# Patient Record
(demographics unavailable — no encounter records)

---

## 2024-11-14 NOTE — PHYSICAL EXAM
[Normal Conjunctiva] : the conjunctiva exhibited no abnormalities [Eyelids - No Xanthelasma] : the eyelids demonstrated no xanthelasmas [Normal Oral Mucosa] : normal oral mucosa [No Oral Pallor] : no oral pallor [No Oral Cyanosis] : no oral cyanosis [Normal Jugular Venous A Waves Present] : normal jugular venous A waves present [Normal Jugular Venous V Waves Present] : normal jugular venous V waves present [No Jugular Venous Peralta A Waves] : no jugular venous peralta A waves [Respiration, Rhythm And Depth] : normal respiratory rhythm and effort [Exaggerated Use Of Accessory Muscles For Inspiration] : no accessory muscle use [Auscultation Breath Sounds / Voice Sounds] : lungs were clear to auscultation bilaterally [Heart Rate And Rhythm] : heart rate and rhythm were normal [Heart Sounds] : normal S1 and S2 [Murmurs] : no murmurs present [Abdomen Soft] : soft [Abdomen Tenderness] : non-tender [Abdomen Mass (___ Cm)] : no abdominal mass palpated [Gait - Sufficient For Exercise Testing] : the gait was sufficient for exercise testing [Abnormal Walk] : normal gait [Skin Color & Pigmentation] : normal skin color and pigmentation [] : no rash [No Venous Stasis] : no venous stasis [Skin Lesions] : no skin lesions [No Xanthoma] : no  xanthoma was observed [Oriented To Time, Place, And Person] : oriented to person, place, and time [Affect] : the affect was normal [Mood] : the mood was normal [No Anxiety] : not feeling anxious [FreeTextEntry1] : see above

## 2024-11-14 NOTE — REASON FOR VISIT
[Follow-Up - Clinic] : a clinic follow-up of [FreeTextEntry2] :  vascular follow up [FreeTextEntry1] : 11/13/2024  Since last visit patient reports no C/P or SOB. Did not see Dr. Weiner. Reports varicose veins symptoms are controlled.  On ASA 81 mg daily.   11/9/2023 Doing ok  On eliquis 5mg BID no bleeding issues leg feels better veins are softer .     71M Varicose veins.   Had pain in the veins L medial calf Reports prior R leg phlebectomy 15 years ago  He is on aspirin  He does not wear compressoin  The L mid calf hurts him, and the vein feels like a hard lump  Meidcaitons: Aspirin  Metformin  Colonoscopy 6-7 years ago PSA-  7.9  He sees a urologist.  Seeing again soon.  No family history of blood clots He is on aspirin , no coronary stents, no CVA  Recent flight from [place], going again in 2 weeks.

## 2024-11-14 NOTE — PHYSICAL EXAM
[Normal Conjunctiva] : the conjunctiva exhibited no abnormalities [Eyelids - No Xanthelasma] : the eyelids demonstrated no xanthelasmas [Normal Oral Mucosa] : normal oral mucosa [No Oral Pallor] : no oral pallor [No Oral Cyanosis] : no oral cyanosis [Normal Jugular Venous A Waves Present] : normal jugular venous A waves present [Normal Jugular Venous V Waves Present] : normal jugular venous V waves present [No Jugular Venous Peralta A Waves] : no jugular venous peralta A waves [Respiration, Rhythm And Depth] : normal respiratory rhythm and effort [Exaggerated Use Of Accessory Muscles For Inspiration] : no accessory muscle use [Auscultation Breath Sounds / Voice Sounds] : lungs were clear to auscultation bilaterally [Heart Rate And Rhythm] : heart rate and rhythm were normal [Heart Sounds] : normal S1 and S2 [Murmurs] : no murmurs present [Abdomen Soft] : soft [Abdomen Tenderness] : non-tender [Abdomen Mass (___ Cm)] : no abdominal mass palpated [Abnormal Walk] : normal gait [Gait - Sufficient For Exercise Testing] : the gait was sufficient for exercise testing [Skin Color & Pigmentation] : normal skin color and pigmentation [] : no rash [No Venous Stasis] : no venous stasis [Skin Lesions] : no skin lesions [No Xanthoma] : no  xanthoma was observed [Oriented To Time, Place, And Person] : oriented to person, place, and time [Affect] : the affect was normal [Mood] : the mood was normal [No Anxiety] : not feeling anxious [FreeTextEntry1] : see above

## 2024-11-14 NOTE — ASSESSMENT
[FreeTextEntry1] : Assessment: 1. VV - L leg thrombosed VV - large amount, and symptomatic - resolved 2. Dm  Plan 1. History of thrombophlebitis: Continue ASA 81 mg daily. Eliquis 1 tab 1 hour prior to flight or drive 4 hours or more. Compression stockings. 2. HTN: Continue ACEI. Home BP monitoring. Low salt diet. Echocardiogram ordered (no recent cardiac testing).  3. Health Maintenance: Healthy diet and exercise. Follow-up with urologist for elevated PSA. 4: Varicose Veins: If bothersome in the future. Referral to Dr. Weiner to eval for phlebectomy. 5. Follow-up in 6 months. Call with any questions. Office will call with test results.      I, Dr. Radames Jacome, personally performed the evaluation and management (E/M) services for this established patient who presents today.  That E/M includes conducting the examination, assessing all new/exacerbated conditions, and establishing a new plan of care.  Today, my ACP, Nadja Shaver, was here to observe my evaluation and management services for this new problem/exacerbated condition to be followed going forward.

## 2024-11-15 NOTE — HISTORY OF PRESENT ILLNESS
[No Pertinent Cardiac History] : no history of aortic stenosis, atrial fibrillation, coronary artery disease, recent myocardial infarction, or implantable device/pacemaker [No Pertinent Pulmonary History] : no history of asthma, COPD, sleep apnea, or smoking [No Adverse Anesthesia Reaction] : no adverse anesthesia reaction in self or family member [Chronic Kidney Disease] : chronic kidney disease [Diabetes] : diabetes [(Patient denies any chest pain, claudication, dyspnea on exertion, orthopnea, palpitations or syncope)] : Patient denies any chest pain, claudication, dyspnea on exertion, orthopnea, palpitations or syncope [Excellent (>10 METs)] : Excellent (>10 METs) [Chronic Anticoagulation] : no chronic anticoagulation [FreeTextEntry1] : Microscopic anterior cervical discectomy and fusion allograft instrumentation  [FreeTextEntry2] : 12/02/2024 [FreeTextEntry3] : Dago Fernandez [FreeTextEntry4] : preop for cervical discectomy for neck pain [FreeTextEntry7] : EKG- NSR no ischemia

## 2025-05-28 NOTE — HISTORY OF PRESENT ILLNESS
[No Pertinent Cardiac History] : no history of aortic stenosis, atrial fibrillation, coronary artery disease, recent myocardial infarction, or implantable device/pacemaker [FreeTextEntry1] : Prostate cystoscopy [FreeTextEntry2] : 06/20/2025 [FreeTextEntry3] : Dr Gilmore [FreeTextEntry4] : preop for prostate biopsy  DM- is back on trulicty will recheck levels  htn- well controlled  EKG- NSR, rate 87 bpm, no ischemia

## 2025-05-28 NOTE — ASSESSMENT
[FreeTextEntry4] : preop for prostate biopsy  DM- is back on trulicty will recheck levels  htn- well controlled  EKG- NSR, rate 87 bpm, no ischemia  CHRISTOPHER MICHAELS is medically optimized for proposed surgical procedure and anesthesia.

## 2025-07-23 NOTE — PHYSICAL EXAM
[Alert] : alert [Well Nourished] : well nourished [No Acute Distress] : no acute distress [Well Developed] : well developed [Normal Sclera/Conjunctiva] : normal sclera/conjunctiva [EOMI] : extra ocular movement intact [No Proptosis] : no proptosis [Thyroid Not Enlarged] : the thyroid was not enlarged [No Respiratory Distress] : no respiratory distress [No Accessory Muscle Use] : no accessory muscle use [Normal Rate] : heart rate was normal [Regular Rhythm] : with a regular rhythm [No Edema] : no peripheral edema [Not Tender] : non-tender [Not Distended] : not distended [Soft] : abdomen soft [No Stigmata of Cushings Syndrome] : no stigmata of Cushings Syndrome [Normal Gait] : normal gait [Right foot was examined, including] : right foot ~C was examined, including visual inspection with sensory and pulse exams [Left foot was examined, including] : left foot ~C was examined, including visual inspection with sensory and pulse exams [Oriented x3] : oriented to person, place, and time [Normal Affect] : the affect was normal [Acanthosis Nigricans] : no acanthosis nigricans [de-identified] : s/p b/l TKR

## 2025-07-23 NOTE — PHYSICAL EXAM
[Alert] : alert [Well Nourished] : well nourished [No Acute Distress] : no acute distress [Well Developed] : well developed [Normal Sclera/Conjunctiva] : normal sclera/conjunctiva [EOMI] : extra ocular movement intact [No Proptosis] : no proptosis [Thyroid Not Enlarged] : the thyroid was not enlarged [No Respiratory Distress] : no respiratory distress [No Accessory Muscle Use] : no accessory muscle use [Normal Rate] : heart rate was normal [Regular Rhythm] : with a regular rhythm [No Edema] : no peripheral edema [Not Tender] : non-tender [Not Distended] : not distended [Soft] : abdomen soft [No Stigmata of Cushings Syndrome] : no stigmata of Cushings Syndrome [Normal Gait] : normal gait [Right foot was examined, including] : right foot ~C was examined, including visual inspection with sensory and pulse exams [Left foot was examined, including] : left foot ~C was examined, including visual inspection with sensory and pulse exams [Oriented x3] : oriented to person, place, and time [Normal Affect] : the affect was normal [Acanthosis Nigricans] : no acanthosis nigricans [de-identified] : s/p b/l TKR

## 2025-07-23 NOTE — ASSESSMENT
[Diabetes Foot Care] : diabetes foot care [Long Term Vascular Complications] : long term vascular complications of diabetes [Carbohydrate Consistent Diet] : carbohydrate consistent diet [Importance of Diet and Exercise] : importance of diet and exercise to improve glycemic control, achieve weight loss and improve cardiovascular health [Self Monitoring of Blood Glucose] : self monitoring of blood glucose [Sick-Day Management] : sick-day management [Retinopathy Screening] : Patient was referred to ophthalmology for retinopathy screening [Diabetic Medications] : Risks and benefits of diabetic medications were discussed [FreeTextEntry1] : 74yo M with prostate ca, dm2, htn, hld.   #DM Type 2, previously uncontrolled >> now improving control  A1c 9.3 >> 7.6%  goal < 7%  - increase to Trulicity 3mg sc weekly after he is back from upcoming cruise  - c/w metformin 1gm bid for now  - Work on diet >> cut out excess carbs/sweets/cakes, etc. especially while cruising (nearly every month pt/spouse take a cruise/vacation).  - SMBG 1-2x/day, fasting and other times of the day in a staggered manner at least for the 2 weeks prior to f/u visits; keep a log to bring along to next visit - Reviewed with patient BG targets for fasting (80-130mg/dL), premeal (<140mg/dL), 2 hours postprandial (< 180mg/dL) as well as hypoglycemia (<70mg/dL) signs/symptoms/risks & management. - Call office with highs/lows in BG. - I discussed the importance of avoiding mild hypoglycemia (FS<70 mg/dl) and severe hypoglycemia (FS<55 mg/dl) with the patient. I also discussed hypoglycemia correction with 4 oz of juice or 4 glucose tablets and rechecking FS in 15 minutes. If FS is still low, repeat 4oz juice or 4 glucose tablets and consume 12 grams of carbohydrates.  - Discussed diabetic diet, decreased intake of simple/processed sugars, increase intake of whole foods with protein and fiber. Increase physical activity as tolerated with cardiovascular exercise 150 min/per week consistently and resistance exercise three days per week.  - Recommended yearly foot exams and home foot precautions. Podiatry referral given today. Has toe nail fungus/discoloration, neuropathy  - Recommended at least yearly ophthalmology exams or as recommended by ophtho - reports UTD, no DR  - Counseling: We discussed diabetes foot care, long term complications of diabetes including but not limited to neuropathy, nephropathy, retinopathy and cardiovascular disease and the benefits of good glycemic control in preventing said complications. We discussed the risks and benefits of diabetes medications as relevant for today, prevention and management of hypoglycemia, importance of medication compliance and blood glucose monitoring.   #HLD LDL goal < 70 - rx for atorvastatin 40mg as well as zetia/fenofibrate - reports he is no longer taking zetia/fenofibrate and also not taking statin, has not been consistent with it, often forgets it at bedtime but planning to resume  - LDL above goal in 140s - restart Atorvastatin 40mg qhs, repeat fasting lipid panel once consistently taking it    #HTN BP goal < 130/80 UACR wnl in May 2025  - c/w lisinopril 20mg daily per pcp   RTC in 3-4 months   Patient verbalized understanding of recommendations and agrees to plan as detailed above.

## 2025-07-23 NOTE — HISTORY OF PRESENT ILLNESS
[FreeTextEntry1] : 72yo M with prostate ca, dm2, htn, hld presenting for initial evaluation.  Here with his wife today.   Preferred name "Dago"   DM2 diagnosed: > 20 years ago  mother also w/ dm2   prior endocrinologist: n/a   current regimen: Trulicity 1.5mg weekly, metformin 1gm BID   past meds & reason for discontinuation: n/a   HbA1c trend: 9.3% in May 2025 >> 7.6% July 2025 >>   SMBG: not good at checking he says, has an older meter at home, would like a new one  hypoglycemia? not that he is aware of   Diet - not the best, vacations every month, cruises etc, diet is not good during these times. Also not great when home  B- fruit, corn muffin, toast  L- no set pattern D- protein/veggies/lots of carbs  Snacks- cakes/sweets  Exercise/Lifestyle - walks the dog 4x/day   Complications: No known CAD,CVA, Nephropathy, Retinopathy,  +Neuropathy eGFR 70 Reports hx of prostate ca >> will be following up with Urologist for possible cyber knife   Optho - sees annually, no DR  Pod - not seeing, discoloration of L. foot 1st toe, and R. foot 5th toe - discolored >> referral for podiatry given   HTN - on lisinopril 20mg daily   HLD - has not been taking consistently; has been rx for atorvastatin 40mg; no longer taking fenofibrate or zetia either

## 2025-07-30 NOTE — DISCUSSION/SUMMARY
[FreeTextEntry1] : In summary   Pleasant, 73 year old with a past medical history of Hyperlipidemia, T2DM (Father - [place] - 60s) =============== =============== CP Exertional = Check CTA Cors  Mn Systolic Murmur MO - TTE  HL - Atorva 40    Addison, kind thanks for the referral.   Addison Wilcox MD [facility] VINITA NICHOLSON Director, Preventive Cardiology [facility] [place]                                                                                                                                                                                                                                                 --                                                                                                                                                                                                                                                                                                                                                                                                                                                                                                                                                                                                                                                                                                                            -----------   [EKG obtained to assist in diagnosis and management of assessed problem(s)] : EKG obtained to assist in diagnosis and management of assessed problem(s)

## 2025-07-30 NOTE — DISCUSSION/SUMMARY
[FreeTextEntry1] : In summary   Pleasant, 73 year old with a past medical history of Hyperlipidemia, T2DM (Father - [place] - 60s) =============== =============== CP Exertional = Check CTA Cors  Mn Systolic Murmur MO - TTE  HL - Atorva 40    Addison, kind thanks for the referral.   Addison Wilcox MD [facility] VINITA NICHOLSON Director, Preventive Cardiology [facility] [place]                                                                                                                                                                                                                                                 --                                                                                                                                                                                                                                                                                                                                                                                                                                                                                                                                                                                                                                                                                                                            -----------   [EKG obtained to assist in diagnosis and management of assessed problem(s)] : EKG obtained to assist in diagnosis and management of assessed problem(s)

## 2025-07-30 NOTE — HISTORY OF PRESENT ILLNESS
[FreeTextEntry1] : Dear Addison,   I had the pleasure of seeing your patient CHRISTOPHER MICHAELS for Cardiometabolic & Cardiovascular evaluation.   As you know, he  is a Pleasant, 73 year old with a past medical history of Hyperlipidemia, T2DM (Father - [place] - 60s) =============== =============== CP Exertional = Check CTA Cors  Mn Systolic Murmur MO - TTE  HL - Atorva 40   7-2025 CC: Heart Issues - Patient reports  chest pain,  localization to the sternum, no radiation to the neck/jaw, no alleviating nor worsening precipitants to CP, no assoc symptoms to CP - Patient notes no associated SOB/Palps/Leg swelling - Reports No associated F/C/N/V/Headaches - Reports Normal Exercise Tolerance - Reports no medication changes - Reports normal mood/quality of life - Reports no associated midnight awakenings from cP - Reports no diet changes - Reports no associated body aches- Reports no recent colds/viruses- Recent labs/imaging reviewed- Relevant Family history reviewed Mother/Father - CVRisk Assessment for 10 Year ACC/AHA Pooled Risk Cohort Equation places this person at < 7.5% Risk of ASCVD